# Patient Record
Sex: FEMALE
[De-identification: names, ages, dates, MRNs, and addresses within clinical notes are randomized per-mention and may not be internally consistent; named-entity substitution may affect disease eponyms.]

---

## 2024-02-27 PROBLEM — Z00.00 ENCOUNTER FOR PREVENTIVE HEALTH EXAMINATION: Status: ACTIVE | Noted: 2024-02-27

## 2024-03-04 ENCOUNTER — NON-APPOINTMENT (OUTPATIENT)
Age: 44
End: 2024-03-04

## 2024-03-06 ENCOUNTER — APPOINTMENT (OUTPATIENT)
Dept: SURGERY | Facility: CLINIC | Age: 44
End: 2024-03-06
Payer: COMMERCIAL

## 2024-03-06 VITALS
DIASTOLIC BLOOD PRESSURE: 86 MMHG | SYSTOLIC BLOOD PRESSURE: 128 MMHG | HEIGHT: 62 IN | HEART RATE: 89 BPM | WEIGHT: 160 LBS | BODY MASS INDEX: 29.44 KG/M2

## 2024-03-06 DIAGNOSIS — S40.859A SUPERFICIAL FOREIGN BODY OF UNSPECIFIED UPPER ARM, INITIAL ENCOUNTER: ICD-10-CM

## 2024-03-06 PROCEDURE — 99203 OFFICE O/P NEW LOW 30 MIN: CPT | Mod: 25

## 2024-03-06 PROCEDURE — 20520 RMVL FB MUSC/TDN SIMPLE: CPT

## 2024-03-06 NOTE — CONSULT LETTER
[( Thank you for referring [unfilled] for consultation for _____ )] : Thank you for referring [unfilled] for consultation for [unfilled] [Dear  ___] : Dear  [unfilled], [Consult Closing:] : Thank you very much for allowing me to participate in the care of this patient.  If you have any questions, please do not hesitate to contact me. [Please see my note below.] : Please see my note below. [FreeTextEntry3] : Jennifer Kruger MD [Sincerely,] : Sincerely,

## 2024-03-06 NOTE — HISTORY OF PRESENT ILLNESS
[de-identified] : 44 yo F presents for consultation regarding removal of Nexplanon from RUE. This is her 2nd Nexplanon in the same area but this time, it was unable to be palpated by the gynecologist so she was referred here for surgical removal.

## 2024-03-06 NOTE — PHYSICAL EXAM
[Respiratory Effort] : normal respiratory effort [Normal Rate and Rhythm] : normal rate and rhythm [de-identified] : NAD, well-appearing [Calm] : calm [de-identified] : no palpable foreign body [de-identified] : soft, nondistended

## 2024-03-06 NOTE — ASSESSMENT
[FreeTextEntry1] : 42 yo F with Nexplanon that has migrated deeply and now is unable to be palpated.  We discussed trying to remove in the office under local anesthesia vs OR with xray. Patient willing to try in office.  After informed consent was signed by the patient and witnessed by the medical assistant, the area was prepped with Chlorhexidine and 10cc of 1% lidocaine with epinephrine were used to anesthetize the skin. A 15 blade was then used to incise over the prior incision. Dissection was carried out through the dermis sharply and the Nexplanon was identified just subfascial in the muscle. It was dissected out using and removed in its entirety. Once the specimen was freed, the wound bed was inspected for hemostasis. Once hemostasis was adequate, the wound was closed in 2 layers with 3-0 and 4-0 Vicryl suture. The wound was dressed with steristrips, gauze and tegaderm. The patient tolerated the procedure well.  Wound care instructions provided Follow up as needed

## 2025-08-20 ENCOUNTER — APPOINTMENT (OUTPATIENT)
Dept: OBGYN | Facility: CLINIC | Age: 45
End: 2025-08-20
Payer: COMMERCIAL

## 2025-08-20 PROCEDURE — 76830 TRANSVAGINAL US NON-OB: CPT | Mod: 26
